# Patient Record
Sex: MALE | Race: OTHER
[De-identification: names, ages, dates, MRNs, and addresses within clinical notes are randomized per-mention and may not be internally consistent; named-entity substitution may affect disease eponyms.]

---

## 2017-05-23 ENCOUNTER — HOSPITAL ENCOUNTER (EMERGENCY)
Dept: HOSPITAL 10 - E/R | Age: 67
Discharge: HOME | End: 2017-05-23
Payer: MEDICARE

## 2017-05-23 VITALS
HEIGHT: 66.14 IN | BODY MASS INDEX: 29.79 KG/M2 | BODY MASS INDEX: 29.79 KG/M2 | HEIGHT: 66.14 IN | WEIGHT: 185.39 LBS | WEIGHT: 185.39 LBS

## 2017-05-23 VITALS
RESPIRATION RATE: 20 BRPM | SYSTOLIC BLOOD PRESSURE: 107 MMHG | HEART RATE: 97 BPM | DIASTOLIC BLOOD PRESSURE: 67 MMHG | TEMPERATURE: 98 F

## 2017-05-23 DIAGNOSIS — R40.2362: ICD-10-CM

## 2017-05-23 DIAGNOSIS — Z87.891: ICD-10-CM

## 2017-05-23 DIAGNOSIS — R40.2252: ICD-10-CM

## 2017-05-23 DIAGNOSIS — R40.2142: ICD-10-CM

## 2017-05-23 DIAGNOSIS — J45.21: Primary | ICD-10-CM

## 2017-05-23 PROCEDURE — 94645 CONT INHLJ TX EACH ADDL HOUR: CPT

## 2017-05-23 PROCEDURE — 96372 THER/PROPH/DIAG INJ SC/IM: CPT

## 2017-05-23 PROCEDURE — 94644 CONT INHLJ TX 1ST HOUR: CPT

## 2017-05-23 PROCEDURE — 99284 EMERGENCY DEPT VISIT MOD MDM: CPT

## 2017-05-23 NOTE — ERD
ER Documentation


Chief Complaint


Date/Time


DATE: 5/23/17 


TIME: 04:30


Chief Complaint


SOB,wheezing,hx asthma





HPI


This is a 67-year-old male comes in with shortness of breath and wheezing.  

Patient has history of asthma.  He did he adamantly says he does not want a 

chest x-ray but does want a breathing treatment.  Denies any fevers or chills.  

Denies any other current issues.





ROS


All systems reviewed and are negative except as per history of present illness.





Medications


Home Meds


Active Scripts


Naproxen* (Naprosyn*) 500 Mg Tablet, 500 MG PO BID Y for PAIN AND/OR 

INFLAMMATION, #30 TAB


   Prov:MANAGUELOD,KESHAWN P NP         8/7/16


Hydrocodone Bit-Acetaminophen* (Norco*)  Mg Tablet, 1 TAB PO Q6 Y for PAIN

, #7 TAB


   Prov:STEPHANIA LYLE MD         1/25/16


Sulfamethoxazole-Trimethoprim* (Bactrim* DS) 800-160 Mg Tab, 1 TAB PO BID for 7 

Days, TAB


   Prov:STEPHANIA LYLE MD         1/25/16


Cephalexin* (Keflex*) 500 Mg Capsule, 500 MG PO QID for 7 Days, CAP


   Prov:STEPHANIA LYLE MD         1/25/16


Reported Medications


Ipratropium Bromide* (Atrovent HFA*) 12.9 Gm Aer.w.adap, 2 PUFF INHALATION QID, 

#1 INHALER


   1/25/16





Allergies


Allergies:  


Coded Allergies:  


     No Known Allergy (Unverified , 1/25/16)





PMhx/Soc


History of Surgery:  Yes (appendectomy)


Hx Neurological Disorder:  No


Hx Respiratory Disorders:  Yes (ASTHMA)


Hx Cardiac Disorders:  No


Hx Psychiatric Problems:  No


Hx Miscellaneous Medical Probl:  No


Hx Alcohol Use:  No


Hx Substance Use:  No


Hx Tobacco Use:  Yes (quit 2014)


Smoking Status:  Former smoker





Physical Exam


Vitals





Vital Signs








  Date Time  Temp Pulse Resp B/P Pulse Ox O2 Delivery O2 Flow Rate FiO2


 


5/23/17 04:23  98 22 107/72 100 Venturi Mask 15.0 


 


5/23/17 03:43      Nasal Cannula 3 


 


5/23/17 03:43      Nasal Cannula 3.0 


 


5/23/17 03:43  95 24  99 Nasal Cannula 3.0 


 


5/23/17 03:22 97.5 101 18 121/74 95   








Physical Exam


Const:  []


Head:   Atraumatic 


Eyes:    Normal Conjunctiva


ENT:    Normal External Ears, Nose and Mouth.


Neck:               Full range of motion..~ No meningismus.


Resp:   Scattered wheezes bilaterally.


Cardio:    Regular rate and rhythm, no murmurs


Abd:    Soft, non tender, non distended. Normal bowel sounds


Skin:    No petechiae or rashes


Back:    No midline or flank tenderness


Ext:    No cyanosis, or edema


Neur:    Awake and alert


Psych:    Normal Mood and Affect


Results 24 hrs





 Current Medications








 Medications


  (Trade)  Dose


 Ordered  Sig/Hemalatha


 Route


 PRN Reason  Start Time


 Stop Time Status Last Admin


Dose Admin


 


 Albuterol


  (Proventil 0.5%


  (Neb))  10 mg  ONCE  STAT


 INH


   5/23/17 03:30


 5/23/17 03:32 DC 5/23/17 03:38


 


 


 Ipratropium


 Bromide


  (Atrovent 0.02%


  (Neb))  1 mg  ONCE  STAT


 INH


   5/23/17 03:30


 5/23/17 03:32 DC 5/23/17 03:38


 


 


 Dexamethasone


  (Decadron)  10 mg  ONCE  STAT


 IM


   5/23/17 03:30


 5/23/17 03:33 DC 5/23/17 03:47


 











Procedures/MDM


Patient's respiratory status has stabilized while in the department and is 

appropriate for outpatient work up.


Exam and work up not consistent w/ impending respiratory failure or 

cardiovascular collapse.





Departure


Diagnosis:  


 Primary Impression:  


 Acute asthma exacerbation


 Asthma severity:  mild intermittent  Qualified Code:  J45.21 - Mild 

intermittent asthma with acute exacerbation


Condition:  Stable











KATHARINA CHANG May 23, 2017 04:31

## 2017-06-05 ENCOUNTER — HOSPITAL ENCOUNTER (INPATIENT)
Dept: HOSPITAL 10 - E/R | Age: 67
LOS: 3 days | Discharge: HOME | DRG: 192 | End: 2017-06-08
Attending: INTERNAL MEDICINE | Admitting: INTERNAL MEDICINE
Payer: COMMERCIAL

## 2017-06-05 VITALS
BODY MASS INDEX: 31.04 KG/M2 | BODY MASS INDEX: 31.04 KG/M2 | HEIGHT: 66 IN | HEIGHT: 66 IN | WEIGHT: 193.12 LBS | WEIGHT: 193.12 LBS

## 2017-06-05 VITALS — DIASTOLIC BLOOD PRESSURE: 75 MMHG | RESPIRATION RATE: 18 BRPM | SYSTOLIC BLOOD PRESSURE: 134 MMHG

## 2017-06-05 VITALS — RESPIRATION RATE: 18 BRPM | DIASTOLIC BLOOD PRESSURE: 65 MMHG | SYSTOLIC BLOOD PRESSURE: 135 MMHG

## 2017-06-05 VITALS — SYSTOLIC BLOOD PRESSURE: 129 MMHG | HEART RATE: 87 BPM | RESPIRATION RATE: 18 BRPM | DIASTOLIC BLOOD PRESSURE: 68 MMHG

## 2017-06-05 VITALS — HEART RATE: 89 BPM

## 2017-06-05 VITALS — HEART RATE: 84 BPM

## 2017-06-05 VITALS — TEMPERATURE: 98.3 F

## 2017-06-05 DIAGNOSIS — Z87.891: ICD-10-CM

## 2017-06-05 DIAGNOSIS — J44.1: Primary | ICD-10-CM

## 2017-06-05 LAB
ADD SCAN DIFF: NO
ADD UMIC: NO
ALBUMIN SERPL-MCNC: 4.6 G/DL (ref 3.3–4.9)
ALBUMIN/GLOB SERPL: 1.58 {RATIO}
ALP SERPL-CCNC: 62 IU/L (ref 42–121)
ALT SERPL-CCNC: 42 IU/L (ref 13–69)
ANION GAP SERPL CALC-SCNC: 11 MMOL/L (ref 8–16)
APTT BLD: 26.9 SEC (ref 25–35)
ARTERIAL COHB: 0.3 % (ref 0–3)
ARTERIAL FRACTION OF OXYHGB: 98.6 % (ref 93–99)
ARTERIAL METHB: 0.4 % (ref 0–1.5)
ARTERIAL PATENCY WRIST A: (no result)
ARTERIAL TOTAL HEMGLOBIN: 13.8 G/DL (ref 12–18)
AST SERPL-CCNC: 21 IU/L (ref 15–46)
BASE EXCESS BLDA CALC-SCNC: -1.3 MMOL/L (ref -3–3)
BASOPHILS # BLD AUTO: 0 10^3/UL (ref 0–0.1)
BASOPHILS NFR BLD: 0.4 % (ref 0–2)
BILIRUB DIRECT SERPL-MCNC: 0 MG/DL (ref 0–0.2)
BILIRUB SERPL-MCNC: 0.2 MG/DL (ref 0.2–1.3)
BLOOD GAS IEPAP: (no result)
BNP SERPL-MCNC: 37 PG/ML (ref 0–125)
BUN SERPL-MCNC: 16 MG/DL (ref 7–20)
CALCIUM SERPL-MCNC: 9.1 MG/DL (ref 8.4–10.2)
CHLORIDE SERPL-SCNC: 105 MMOL/L (ref 97–110)
CO2 SERPL-SCNC: 30 MMOL/L (ref 21–31)
COLOR UR: (no result)
CREAT SERPL-MCNC: 0.91 MG/DL (ref 0.61–1.24)
EOSINOPHIL # BLD: 0.4 10^3/UL (ref 0–0.5)
EOSINOPHIL NFR BLD: 5.1 % (ref 0–7)
ERYTHROCYTE [DISTWIDTH] IN BLOOD BY AUTOMATED COUNT: 13.2 % (ref 11.5–14.5)
GLOBULIN SER-MCNC: 2.9 G/DL (ref 1.3–3.2)
GLUCOSE SERPL-MCNC: 119 MG/DL (ref 70–220)
GLUCOSE UR STRIP-MCNC: NEGATIVE %
HCO3 BLDA-SCNC: 24.4 MMOL/L (ref 22–26)
HCT VFR BLD CALC: 40.8 % (ref 42–52)
HGB BLD-MCNC: 13.2 G/DL (ref 14–18)
INR PPP: 0.89
KETONES UR STRIP.AUTO-MCNC: NEGATIVE MG/DL
LYMPHOCYTES # BLD AUTO: 2.4 10^3/UL (ref 0.8–2.9)
LYMPHOCYTES NFR BLD AUTO: 31.9 % (ref 15–51)
MCH RBC QN AUTO: 31.1 PG (ref 29–33)
MCHC RBC AUTO-ENTMCNC: 32.4 G/DL (ref 32–37)
MCV RBC AUTO: 96.2 FL (ref 82–101)
MODE: (no result)
MONOCYTES # BLD: 0.9 10^3/UL (ref 0.3–0.9)
MONOCYTES NFR BLD: 11.2 % (ref 0–11)
NEUTROPHILS # BLD: 3.9 10^3/UL (ref 1.6–7.5)
NEUTROPHILS NFR BLD AUTO: 51.1 % (ref 39–77)
NITRITE UR QL STRIP.AUTO: NEGATIVE
NRBC # BLD MANUAL: 0 10^3/UL (ref 0–0)
NRBC BLD QL: 0 /100WBC (ref 0–0)
O2 A-A PPRESDIFF RESPIRATORY: 93.4 MMHG (ref 7–24)
O2/TOTAL GAS SETTING VFR VENT: 60 %
PLATELET # BLD: 234 10^3/UL (ref 140–415)
PMV BLD AUTO: 9.4 FL (ref 7.4–10.4)
POTASSIUM SERPL-SCNC: 4.3 MMOL/L (ref 3.5–5.1)
PROT SERPL-MCNC: 7.5 G/DL (ref 6.1–8.1)
PROTHROMBIN TIME: 12 SEC (ref 12.2–14.2)
PT RATIO: 0.9
RBC # BLD AUTO: 4.24 10^6/UL (ref 4.7–6.1)
RBC # UR AUTO: NEGATIVE /UL
SODIUM SERPL-SCNC: 142 MMOL/L (ref 135–144)
TROPONIN I SERPL-MCNC: < 0.012 NG/ML (ref 0–0.12)
URINE BILIRUBIN (DIP): NEGATIVE
URINE TOTAL PROTEIN (DIP): NEGATIVE
UROBILINOGEN UR STRIP-ACNC: (no result) (ref 0.1–1)
WBC # BLD AUTO: 7.6 10^3/UL (ref 4.8–10.8)
WBC # UR STRIP: NEGATIVE /UL

## 2017-06-05 PROCEDURE — 85025 COMPLETE CBC W/AUTO DIFF WBC: CPT

## 2017-06-05 PROCEDURE — 82803 BLOOD GASES ANY COMBINATION: CPT

## 2017-06-05 PROCEDURE — 85610 PROTHROMBIN TIME: CPT

## 2017-06-05 PROCEDURE — 87081 CULTURE SCREEN ONLY: CPT

## 2017-06-05 PROCEDURE — 85730 THROMBOPLASTIN TIME PARTIAL: CPT

## 2017-06-05 PROCEDURE — 96372 THER/PROPH/DIAG INJ SC/IM: CPT

## 2017-06-05 PROCEDURE — 84484 ASSAY OF TROPONIN QUANT: CPT

## 2017-06-05 PROCEDURE — 80053 COMPREHEN METABOLIC PANEL: CPT

## 2017-06-05 PROCEDURE — 83880 ASSAY OF NATRIURETIC PEPTIDE: CPT

## 2017-06-05 PROCEDURE — 93306 TTE W/DOPPLER COMPLETE: CPT

## 2017-06-05 PROCEDURE — 94644 CONT INHLJ TX 1ST HOUR: CPT

## 2017-06-05 PROCEDURE — 83605 ASSAY OF LACTIC ACID: CPT

## 2017-06-05 PROCEDURE — 81003 URINALYSIS AUTO W/O SCOPE: CPT

## 2017-06-05 PROCEDURE — 94660 CPAP INITIATION&MGMT: CPT

## 2017-06-05 PROCEDURE — 96375 TX/PRO/DX INJ NEW DRUG ADDON: CPT

## 2017-06-05 PROCEDURE — 71010: CPT

## 2017-06-05 PROCEDURE — 36600 WITHDRAWAL OF ARTERIAL BLOOD: CPT

## 2017-06-05 PROCEDURE — 36415 COLL VENOUS BLD VENIPUNCTURE: CPT

## 2017-06-05 PROCEDURE — 94664 DEMO&/EVAL PT USE INHALER: CPT

## 2017-06-05 PROCEDURE — 96376 TX/PRO/DX INJ SAME DRUG ADON: CPT

## 2017-06-05 PROCEDURE — 96374 THER/PROPH/DIAG INJ IV PUSH: CPT

## 2017-06-05 PROCEDURE — 93005 ELECTROCARDIOGRAM TRACING: CPT

## 2017-06-05 PROCEDURE — 87040 BLOOD CULTURE FOR BACTERIA: CPT

## 2017-06-05 RX ADMIN — ALBUTEROL SULFATE SCH PUFF: 90 AEROSOL, METERED RESPIRATORY (INHALATION) at 13:00

## 2017-06-05 RX ADMIN — ENOXAPARIN SODIUM SCH MG: 100 INJECTION SUBCUTANEOUS at 09:56

## 2017-06-05 RX ADMIN — ALBUTEROL SULFATE SCH PUFF: 90 AEROSOL, METERED RESPIRATORY (INHALATION) at 21:53

## 2017-06-05 RX ADMIN — ALBUTEROL SULFATE SCH PUFF: 90 AEROSOL, METERED RESPIRATORY (INHALATION) at 17:27

## 2017-06-05 RX ADMIN — ALBUTEROL SULFATE SCH PUFF: 90 AEROSOL, METERED RESPIRATORY (INHALATION) at 09:53

## 2017-06-05 NOTE — HP
DATE OF ADMISSION: 06/05/2017

 

TIME SEEN: 7 a.m.

 

CHIEF COMPLAINT: Shortness of breath.

 

HISTORY OF PRESENT ILLNESS: The patient is a 67-year-old male with a history of asthma/COPD who pres
ented to the emergency department complaining of shortness of breath.  Her symptoms have been progre
ssively getting worse.  He denied chest pain, nausea, vomiting, fever, chills, lightheadedness, palp
itations.  When he presented to the ER, he was having difficulty breathing and he was placed on BiPA
P.  His initial ABG shows a pH of 7.355, pCO2 of 45 and pO2 to 85, bicarbonate 24 while he was on Bi
PAP on 60% FIO2.  Currently, he is in the ER and also BiPAP and he actually looks comfortable and ab
le to speak in full sentences, [place], I talked to him through an [place] . 

 

In the ER, a chest x-ray showed shallow lung inflation with a probable top normal heart size. Questi
on slight aortic calcifications. 

 

REVIEW OF SYSTEMS:  A 12-point review was performed, negative except as mentioned in HPI.

 

PAST MEDICAL HISTORY:  As per HPI.

 

PAST SURGICAL HISTORY:  Appendectomy.

 

SOCIAL HISTORY:  He is an ex-smoker.  He quit almost 3 years ago.

 

ALLERGIES:  NO KNOWN DRUG ALLERGIES.

 

HOME MEDICATIONS:

1.  Albuterol.

2.  Atrovent.

3.  Norco. 

4.  Naproxen.

 

PHYSICAL EXAMINATION:

VITAL SIGNS:  Stable.

GENERAL:  The patient lying in the gurney in no acute distress, looks comfortable.

HEENT:  No obvious head deformity. Pupils equal, round and react to light. Extraocular muscles intac
t.

CARDIOVASCULAR:  Regular rate and rhythm.  No extra heart sounds.

LUNGS:  Slight scattered wheezing.

ABDOMEN:  Soft, nontender, nondistended.  Positive bowel sounds.

EXTREMITIES:  He has 1+ pitting edema in bilateral lower extremities.

NEUROLOGIC:  No focal deficits.

 

LABORATORY DATA:  CBC and CMP within acceptable range.

 

IMAGING:  Chest x-ray as mentioned in HPI.

 

IMPRESSION: Chronic obstructive pulmonary disease exacerbation.

 

PLAN: We will place him on oxygen, bronchodilators, and steroids.  Positive pressure ventilation as 
needed.  Pulmonary consult as needed.

 

Further workup ____  per clinical course.

 

 

Dictated By: KATHARINA ADAM/NILDA

DD:    06/05/2017 08:50:53

DT:    06/05/2017 10:58:29

Conf#: [id number]

DID#:  [id number]

## 2017-06-05 NOTE — RADRPT
Echocardiogram Report

 

Patient Name:  LILI ZAYAS  Gender:       Male

MRN:           [id number]          Accession #:  [id number]

Birth Date:    1950      Study Date:   05-Jun-2017

Sonographer:   GARCÍA Lopez BRIDGET     Location:     3

 

Ref. Physician: KATHARINA KHALIL

Quality: Good

 

Procedures: Transthoracic echocardiogram with complete 2D, M-Mode, and 

doppler examination.

Indications: Shortness of breath.

 

2D/M Mode                          Doppler

Measurement  Value  Normal Ranges  Measurement    Value  Normal Ranges

LVIDd 2D     4.7    3.5 - 5.6 cm   AV Peak Rey    1.1    m/sec

LVIDs 2D     2.2    2.1 - 4.1 cm   AV Peak PG     5.0    mmHg

FS 2D        53.2   %              LVOT Peak Rey  1.1    m/sec

LVPWd 2D     1.1    0.6 - 1.1 cm   LVOT Peak PG   5.0    mmHg

IVSd 2D      1.2    0.6 - 1.1 cm   MV E Peak Rey  0.6    m/sec

IVS/LVPW 2D  1.1                   MV A Peak Rey  0.8    m/sec

AoR Diam 2D  3.7    2.0 - 3.7 cm   MV E/A         0.8

LA/Ao 2D     1      0 - 1          MV Decel Time  148    msec

EDV 2D       105.0  cm3            MV E/A         0.8

ESV 2D       10.8   cm3

LA Dimen 2D  3.2    2.3 - 4.0 cm

 

Findings

Left Ventricle: Normal left ventricular systolic function.  Normal left 

ventricular cavity size.  Mild concentric left ventricular hypertrophy.  

Ejection fraction is visually estimated at 65 %.  Tissue Doppler/Mitral 

Doppler indices are consistent with impaired relaxation (Stage I 

diastolic dysfunction).

Right Ventricle: Normal right ventricular systolic function.  Not well 

visualized.

Left Atrium: The left atrium is normal in size.

Right Atrium: The right atrium is normal in size.

Mitral Valve: Normal appearance and function of the mitral valve with 

trace physiologic regurgitation.

Aortic Valve: Normal appearance of the aortic valve.  No significant 

aortic stenosis or insufficiency.

Tricuspid Valve: Normal appearance of the tricuspid valve.  Unable to 

obtain RVSP due to minimal presence of tricuspid regurgitation.

Pulmonic Valve: Pulmonic valve not well visualized.

Pericardium: Normal pericardium with no significant pericardial effusion.

Aorta: Normal aortic root.

IVC: Dilated IVC with respiratory collapse consistent with elevated 

right atrial pressure.

 

Conclusions

1.Normal left ventricular systolic function.  Normal left ventricular 

cavity size.  Mild concentric left ventricular hypertrophy.  Ejection 

fraction is visually estimated at 65 %.  Tissue Doppler/Mitral Doppler 

indices are consistent with impaired relaxation (Stage I diastolic 

dysfunction).

2.The left atrium is normal in size.

3.Normal appearance and function of the mitral valve with trace 

physiologic regurgitation.

4.Normal appearance of the aortic valve.  No significant aortic 

stenosis or insufficiency.

5.Normal appearance of the tricuspid valve.  Unable to obtain RVSP due 

to minimal presence of tricuspid regurgitation.

 

Electronically Signed By:

Gonzalez Holguin

05-Jun-2017 18:08:45  -0700

 

Patient Name: LILI ZAYAS

MRN: [id number]

Study Date: 05-Jun-2017

 

[id number]

## 2017-06-05 NOTE — ERA
ER Documentation


Chief Complaint


Date/Time


DATE: 17 


TIME: 03:20


Chief Complaint


BIBA RA89 from home,SOB,hx asthma/COPD





HPI


This is a 67-year-old male brought in from home via rescue with complaints of 

shortness of breath.  No nausea no vomiting no fevers no chills.  Patient 

states that he became acutely short of breath over the last few hours.  No 

other current complaints.  No chest pain.  History of multiple previous 

admissions for respiratory complaints





ROS


All systems reviewed and are negative except as per history of present illness.





Medications


Home Meds


Active Scripts


Prednisone* (Prednisone*) 20 Mg Tab, 40 MG PO DAILY for 4 Days, TAB


   Prov:KATHARINA CHANG S.         17


Albuterol Sulfate* (Ventolin HFA*) 18 Gm Hfa.aer.ad, 2 PUFF INHALATION Q4H, #1 

INHALER


   Prov:KATHARINA CHANG.         17


Naproxen* (Naprosyn*) 500 Mg Tablet, 500 MG PO BID Y for PAIN AND/OR 

INFLAMMATION, #30 TAB


   Prov:KESHAWN GARY P NP         16


Hydrocodone Bit-Acetaminophen* (Norco*)  Mg Tablet, 1 TAB PO Q6 Y for PAIN

, #7 TAB


   Prov:STEPHANIA LYLE MD         16


Sulfamethoxazole-Trimethoprim* (Bactrim* DS) 800-160 Mg Tab, 1 TAB PO BID for 7 

Days, TAB


   Prov:STEPHANIA LYLE MD         16


Cephalexin* (Keflex*) 500 Mg Capsule, 500 MG PO QID for 7 Days, CAP


   Prov:STEPHANIA LYLE MD         16


Reported Medications


Ipratropium Bromide* (Atrovent HFA*) 12.9 Gm Aer.w.adap, 2 PUFF INHALATION QID, 

#1 INHALER


   16





Allergies


Allergies:  


Coded Allergies:  


     No Known Allergy (Unverified , 16)





PMhx/Soc


History of Surgery:  Yes (appendectomy)


Hx Neurological Disorder:  No


Hx Respiratory Disorders:  Yes (ASTHMA; COPD)


Hx Cardiac Disorders:  No


Hx Psychiatric Problems:  No


Hx Miscellaneous Medical Probl:  No


Hx Alcohol Use:  No


Hx Substance Use:  No


Hx Tobacco Use:  Yes (quit )


Smoking Status:  Former smoker





Physical Exam


Vitals





Vital Signs








  Date Time  Temp Pulse Resp B/P Pulse Ox O2 Delivery O2 Flow Rate FiO2


 


17 01:58 97.3 98 28 108/95 100 BIPAP  


 


17 01:30  99   100   60


 


17 01:27 97.3 92 32 123/89 100   








Physical Exam


Const:  []


Head:   Atraumatic 


Eyes:    Normal Conjunctiva


ENT:    Normal External Ears, Nose and Mouth.


Neck:               Full range of motion..~ No meningismus.


Resp:    Clear to auscultation bilaterally


Cardio:    Regular rate and rhythm, no murmurs


Abd:    Soft, non tender, non distended. Normal bowel sounds


Skin:    No petechiae or rashes


Back:    No midline or flank tenderness


Ext:    No cyanosis, or edema


Neur:    Awake and alert


Psych:    Normal Mood and Affect


Result Diagram:  


17 [id number] 013





Results 24 hrs





 Laboratory Tests








Test


  17


01:23 17


01:30


 


Blood Gas Specimen Source Blood arterial  


 


Arterial Blood Date Drawn


  2017


2:55:18 AM 


 


 


Arterial Blood pH (Temp


corrected) 7.355 


  


 


 


Arterial Blood pCO2 (Temp


correct) 44.7mmhg 


  


 


 


Arterial Blood pO2 (Temp


corrected) 285.2mmHG 


  


 


 


Arterial Blood HCO3 24.4mmol/L  


 


Arterial Blood Base Excess -1.3mmol/L  


 


Arterial Blood Oxygen


Saturation 99.3mmHG 


  


 


 


Chandler Test ACCEPTAB  


 


Arterial Blood Gas Puncture


Site Right Radial 


  


 


 


Arterial Blood


Carboxyhemoglobin 0.3% 


  


 


 


Arterial Blood Methemoglobin 0.4%  


 


Blood Gas A-a O2 Differential 93.4mmHg  


 


Oxyhemoglobin Percent 98.6%  


 


Total Hemoglobin 13.8g/dl  


 


Blood Gas Temperature 37.0C  


 


Blood Gas Respiration Rate 16.0  


 


Blood Gas Actual Respiration


Rate 20 


  


 


 


Blood Gas Modality MASK - BIPAP  


 


FiO2 60.0%  


 


Blood Gas Inspiratory Time 0.7  


 


Blood Gas IPAP/EPAP Ratio 15/5  


 


Blood Gas Notified Whom AA  


 


Blood Gas Notified Time


  2017


3:08:14 AM 


 


 


White Blood Count  7.610^3/ul 


 


Red Blood Count  4.2410^6/ul 


 


Hemoglobin  13.2g/dl 


 


Hematocrit  40.8% 


 


Mean Corpuscular Volume  96.2fl 


 


Mean Corpuscular Hemoglobin  31.1pg 


 


Mean Corpuscular Hemoglobin


Concent 


  32.4g/dl 


 


 


Red Cell Distribution Width  13.2% 


 


Platelet Count  95953^3/UL 


 


Mean Platelet Volume  9.4fl 


 


Neutrophils %  51.1% 


 


Lymphocytes %  31.9% 


 


Monocytes %  11.2% 


 


Eosinophils %  5.1% 


 


Basophils %  0.4% 


 


Nucleated Red Blood Cells %  0.0/100WBC 


 


Neutrophils #  3.910^3/ul 


 


Lymphocytes #  2.410^3/ul 


 


Monocytes #  0.910^3/ul 


 


Eosinophils #  0.410^3/ul 


 


Basophils #  0.010^3/ul 


 


Nucleated Red Blood Cells #  0.010^3/ul 


 


Prothrombin Time  12.0Sec 


 


Prothrombin Time Ratio  0.9 


 


INR International Normalized


Ratio 


  0.89 


 


 


Activated Partial


Thromboplast Time 


  26.9Sec 


 


 


Sodium Level  142mmol/L 


 


Potassium Level  4.3mmol/L 


 


Chloride Level  105mmol/L 


 


Carbon Dioxide Level  30mmol/L 


 


Anion Gap  11 


 


Blood Urea Nitrogen  16mg/dl 


 


Creatinine  0.91mg/dl 


 


Glucose Level  119mg/dl 


 


Lactic Acid Level  1.2mmol/L 


 


Calcium Level  9.1mg/dl 


 


Total Bilirubin  0.2mg/dl 


 


Direct Bilirubin  0.00mg/dl 


 


Indirect Bilirubin  0.2mg/dl 


 


Aspartate Amino Transf


(AST/SGOT) 


  21IU/L 


 


 


Alanine Aminotransferase


(ALT/SGPT) 


  42IU/L 


 


 


Alkaline Phosphatase  62IU/L 


 


Troponin I  < 0.012ng/ml 


 


B-Type Natriuretic Peptide  37PG/ML 


 


Total Protein  7.5g/dl 


 


Albumin  4.6g/dl 


 


Globulin  2.90g/dl 


 


Albumin/Globulin Ratio  1.58 








 Current Medications








 Medications


  (Trade)  Dose


 Ordered  Sig/Hemalatha


 Route


 PRN Reason  Start Time


 Stop Time Status Last Admin


Dose Admin


 


 Albuterol


  (Proventil 0.5%


  (Neb))  10 mg  ONCE  STAT


 INH


   17 01:23


 17 01:25 DC 17 01:35


 


 


 Ipratropium


 Bromide


  (Atrovent 0.02%


  (Neb))  1 mg  ONCE  STAT


 INH


   17 01:23


 17 01:25 DC 17 01:35


 


 


 Methylprednisolone


 Sodium Succinate


  (Solu-Medrol)  125 mg  ONCE  STAT


 IV


   17 01:23


 17 01:26 DC 17 01:54


 


 


 Lorazepam


  (Ativan)  1 mg  ONCE  ONCE


 IV


   17 01:30


 17 01:31 DC 17 01:54


 











Procedures/MDM


EKG: 


Rate/Rhythm:             [Normal Sinus Rhythm]


QRS, ST, T-waves:    [No changes consistent w/ acute ischemia]


Impression:      [No evidence of ischemia or arrhythmia] 





Chest X-ray 1V Interpreted by me:


Soft Tissue:                                               No acute 

abnormalities


Bones:                                                    No acute abnormalities


Mediastinum/Cardiac Silhouette/Lungs:     [No acute abnormalities]





Medical decision-makin general respiratory failure secondary COPD.  

Started on BiPAP.  Improving significantly with steroids and nebulized therapy, 

however patient will be admitted to panel for further evaluation and management 

given that he is still using accessory muscles.





Critical Care:


   Time:             45 minutes


   Treatments/Evaluations:   Close monitoring and treatment of unstable vital 

signs, cardiorespiratory, and neurologic status, while maintaining tight 

balance of fluid, respiratory, and cardiac interventions.





Departure


Diagnosis:  


 Primary Impression:  


 Shortness of breath


Condition:  Stable











KATHARINA CHANG 2017 03:22

## 2017-06-05 NOTE — RADRPT
PROCEDURE:   XR Chest. 

 

CLINICAL INDICATION:   Shortness of breath 

 

TECHNIQUE:   Portable single view of the chest

 

COMPARISON:   None. 

 

FINDINGS:

Shallow lung inflation accentuates the heart size which may be top normal.  There may be slight aort
ic calcification.  No acute infiltrate, pleural effusion, or overt congestive heart failure is seen.
  No bony abnormality is seen. 

 

IMPRESSION:

Shallow lung inflation with probable top normal heart size.  Question slight aortic calcification. 

 

RPTAT: HLBE

_____________________________________________ 

Zayra Noyola Physician           Date    Time 

Electronically viewed and signed by Zayra Noyola Physician on 06/05/2017 04:31 

 

D:  06/05/2017 04:31  T:  06/05/2017 04:31

LE/

## 2017-06-06 VITALS — DIASTOLIC BLOOD PRESSURE: 70 MMHG | RESPIRATION RATE: 20 BRPM | SYSTOLIC BLOOD PRESSURE: 111 MMHG

## 2017-06-06 VITALS — HEART RATE: 83 BPM

## 2017-06-06 VITALS — SYSTOLIC BLOOD PRESSURE: 132 MMHG | RESPIRATION RATE: 19 BRPM | DIASTOLIC BLOOD PRESSURE: 70 MMHG

## 2017-06-06 VITALS — SYSTOLIC BLOOD PRESSURE: 128 MMHG | DIASTOLIC BLOOD PRESSURE: 69 MMHG | RESPIRATION RATE: 20 BRPM

## 2017-06-06 VITALS — HEART RATE: 79 BPM

## 2017-06-06 VITALS — HEART RATE: 77 BPM

## 2017-06-06 VITALS — HEART RATE: 91 BPM

## 2017-06-06 VITALS — HEART RATE: 69 BPM

## 2017-06-06 VITALS — SYSTOLIC BLOOD PRESSURE: 116 MMHG | RESPIRATION RATE: 20 BRPM | DIASTOLIC BLOOD PRESSURE: 66 MMHG

## 2017-06-06 VITALS — SYSTOLIC BLOOD PRESSURE: 110 MMHG | RESPIRATION RATE: 20 BRPM | DIASTOLIC BLOOD PRESSURE: 70 MMHG

## 2017-06-06 VITALS — RESPIRATION RATE: 20 BRPM | DIASTOLIC BLOOD PRESSURE: 71 MMHG | SYSTOLIC BLOOD PRESSURE: 127 MMHG

## 2017-06-06 RX ADMIN — ENOXAPARIN SODIUM SCH MG: 100 INJECTION SUBCUTANEOUS at 09:17

## 2017-06-06 RX ADMIN — ALBUTEROL SULFATE SCH PUFF: 90 AEROSOL, METERED RESPIRATORY (INHALATION) at 19:48

## 2017-06-06 RX ADMIN — ALBUTEROL SULFATE SCH PUFF: 90 AEROSOL, METERED RESPIRATORY (INHALATION) at 12:23

## 2017-06-06 RX ADMIN — ALBUTEROL SULFATE SCH PUFF: 90 AEROSOL, METERED RESPIRATORY (INHALATION) at 01:00

## 2017-06-06 RX ADMIN — LEVOFLOXACIN SCH MLS/HR: 250 INJECTION, SOLUTION INTRAVENOUS at 17:11

## 2017-06-06 RX ADMIN — ALBUTEROL SULFATE SCH PUFF: 90 AEROSOL, METERED RESPIRATORY (INHALATION) at 09:10

## 2017-06-06 RX ADMIN — ALBUTEROL SULFATE SCH PUFF: 90 AEROSOL, METERED RESPIRATORY (INHALATION) at 05:53

## 2017-06-06 NOTE — PN
Date/Time of Note


Date/Time of Note


DATE: 6/6/17 


TIME: 15:07





Assessment/Plan


VTE Prophylaxis


VTE Prophylaxis Intervention:  LMWH





Lines/Catheters


IV Catheter Type (from Nrs):  Saline Lock


Urinary Cath still in place:  No





Assessment/Plan


Assessment/Plan


1. COPD exacerbation, inhaler/soluMedrol/levaquin





Subjective


24 Hr Interval Summary


Free Text/Dictation


less SOB but still unable to finish one sentence





Exam/Review of Systems


Vital Signs


Vitals





 Vital Signs








  Date Time  Temp Pulse Resp B/P Pulse Ox O2 Delivery O2 Flow Rate FiO2


 


6/6/17 12:46  79      


 


6/6/17 11:16 97.9  20 111/70 94   


 


6/6/17 08:30      Nasal Cannula 2.0 


 


6/5/17 04:25        30














 Intake and Output   


 


 6/5/17 6/5/17 6/6/17





 15:00 23:00 07:00


 


Intake Total  500 ml 600 ml


 


Balance  500 ml 600 ml











Exam


Constitutional:  alert, oriented, well developed


Psych:  nl mood/affect, no complaints


Head:  atraumatic, normocephalic


Eyes:  EOMI, nl conjunctiva, nl lids


ENMT:  nl external ears & nose, nl lips & teeth, nl nasal mucosa & septum


Neck:  non-tender, supple


Respiratory:  diminished breath sounds


Cardiovascular:  nl pulses, regular rate and rhythm, 


   No S3, No S4, No bruits, No diastolic murmur, No edema, No gallop, No 

irregular rhythm, No jugular venous distention (JVD), No murmurs/extra sounds, 

No other, No rub, No systolic murmur


Gastrointestinal:  nl liver, spleen, non-tender, soft, 


   No ascites, No bowel sounds, No distended, No firm, No hepatomegaly, No mass

, No other, No rebound or guarding, No splenomegaly, No surgical scars, No 

tender


Musculoskeletal:  nl extremities to inspection


Extremities:  normal pulses, 


   No calf tenderness, No clubbing, No cyanosis, No edema, No other, No 

palpable cord, No pitting pedal edema, No tenderness


Neurological:  CNS II-XII intact, nl mental status, nl speech, nl strength


Skin:  nl turgor


Lymph:  nl lymph nodes





Results


Result Diagram:  


6/5/17 0130                                                                    

            6/5/17 0130








Medications


Medications





 Current Medications


Ondansetron HCl (Zofran Inj) 4 mg Q6H  PRN IV NAUSEA AND/OR VOMITING;  Start 6/5 /17 at 08:30


Acetaminophen (Tylenol Tab) 650 mg Q6H  PRN PO PAIN LEVEL 1-3 OR FEVER;  Start 6 /5/17 at 08:30


Morphine Sulfate (morphine) 2 mg Q4H  PRN IV SEVERE PAIN LEVEL 7-10;  Start 6/5/ 17 at 08:30


Enoxaparin Sodium (Lovenox) 40 mg DAILY SC  Last administered on 6/6/17at 09:17

; Admin Dose 40 MG;  Start 6/5/17 at 09:00


Acetaminophen/ Hydrocodone Bitart (Norco (10/325)) 1 tab Q6H  PRN PO PAIN;  

Start 6/5/17 at 08:30


Naproxen (Naprosyn) 500 mg BID  PRN PO PAIN AND/OR INFLAMMATION;  Start 6/5/17 

at 09:00


Methylprednisolone Sodium Succinate (Solu-Medrol) 80 mg DAILY IV  Last 

administered on 6/6/17at 09:10; Admin Dose 80 MG;  Start 6/5/17 at 09:00











LAYNE VAZQUEZ MD Jun 6, 2017 15:09

## 2017-06-07 VITALS — RESPIRATION RATE: 18 BRPM | DIASTOLIC BLOOD PRESSURE: 73 MMHG | SYSTOLIC BLOOD PRESSURE: 117 MMHG

## 2017-06-07 VITALS — RESPIRATION RATE: 17 BRPM | SYSTOLIC BLOOD PRESSURE: 120 MMHG | DIASTOLIC BLOOD PRESSURE: 67 MMHG

## 2017-06-07 VITALS — DIASTOLIC BLOOD PRESSURE: 75 MMHG | SYSTOLIC BLOOD PRESSURE: 145 MMHG | RESPIRATION RATE: 17 BRPM

## 2017-06-07 VITALS — RESPIRATION RATE: 19 BRPM | SYSTOLIC BLOOD PRESSURE: 125 MMHG | DIASTOLIC BLOOD PRESSURE: 68 MMHG

## 2017-06-07 VITALS — SYSTOLIC BLOOD PRESSURE: 127 MMHG | DIASTOLIC BLOOD PRESSURE: 68 MMHG | RESPIRATION RATE: 20 BRPM

## 2017-06-07 VITALS — HEART RATE: 96 BPM

## 2017-06-07 VITALS — HEART RATE: 110 BPM

## 2017-06-07 VITALS — RESPIRATION RATE: 18 BRPM | SYSTOLIC BLOOD PRESSURE: 137 MMHG | DIASTOLIC BLOOD PRESSURE: 70 MMHG

## 2017-06-07 VITALS — HEART RATE: 71 BPM

## 2017-06-07 VITALS — HEART RATE: 80 BPM

## 2017-06-07 VITALS — HEART RATE: 70 BPM

## 2017-06-07 RX ADMIN — ACETYLCYSTEINE SCH ML: 200 SOLUTION ORAL; RESPIRATORY (INHALATION) at 16:30

## 2017-06-07 RX ADMIN — ALBUTEROL SULFATE SCH PUFF: 90 AEROSOL, METERED RESPIRATORY (INHALATION) at 14:32

## 2017-06-07 RX ADMIN — ALBUTEROL SULFATE SCH PUFF: 90 AEROSOL, METERED RESPIRATORY (INHALATION) at 07:39

## 2017-06-07 RX ADMIN — ENOXAPARIN SODIUM SCH MG: 100 INJECTION SUBCUTANEOUS at 07:44

## 2017-06-07 RX ADMIN — ACETYLCYSTEINE SCH ML: 200 SOLUTION ORAL; RESPIRATORY (INHALATION) at 20:00

## 2017-06-07 RX ADMIN — ALBUTEROL SULFATE SCH PUFF: 90 AEROSOL, METERED RESPIRATORY (INHALATION) at 21:14

## 2017-06-07 RX ADMIN — LEVOFLOXACIN SCH MLS/HR: 250 INJECTION, SOLUTION INTRAVENOUS at 16:03

## 2017-06-07 RX ADMIN — ALBUTEROL SULFATE SCH PUFF: 90 AEROSOL, METERED RESPIRATORY (INHALATION) at 00:45

## 2017-06-07 NOTE — PN
Date/Time of Note


Date/Time of Note


DATE: 6/7/17 


TIME: 16:23





Assessment/Plan


VTE Prophylaxis


VTE Prophylaxis Intervention:  heparin





Lines/Catheters


IV Catheter Type (from Nrs):  Peripheral IV


Urinary Cath still in place:  No





Assessment/Plan


Assessment/Plan


1. COPD exacerbation, inhaler/soluMedrol/levaquin, add mucomyst





Subjective


24 Hr Interval Summary


Free Text/Dictation


still has SOB





Exam/Review of Systems


Vital Signs


Vitals





 Vital Signs








  Date Time  Temp Pulse Resp B/P Pulse Ox O2 Delivery O2 Flow Rate FiO2


 


6/7/17 15:44 97.8 89 18 137/70 93   


 


6/6/17 21:49      Nasal Cannula 2.0 


 


6/5/17 04:25        30














 Intake and Output   


 


 6/6/17 6/6/17 6/7/17





 15:00 23:00 07:00


 


Intake Total  1200 ml 800 ml


 


Balance  1200 ml 800 ml











Exam


Constitutional:  alert, oriented, well developed


Psych:  nl mood/affect, no complaints


Head:  atraumatic, normocephalic


Eyes:  EOMI, PERRL, nl conjunctiva, nl lids


ENMT:  nl external ears & nose, nl lips & teeth, nl nasal mucosa & septum


Neck:  non-tender, supple


Respiratory:  diminished breath sounds


Cardiovascular:  nl pulses, regular rate and rhythm, 


   No S3, No S4, No bruits, No diastolic murmur, No edema, No gallop, No 

irregular rhythm, No jugular venous distention (JVD), No murmurs/extra sounds, 

No other, No rub, No systolic murmur


Gastrointestinal:  nl liver, spleen, non-tender, soft, 


   No ascites, No bowel sounds, No distended, No firm, No hepatomegaly, No mass

, No other, No rebound or guarding, No splenomegaly, No surgical scars, No 

tender


Musculoskeletal:  nl extremities to inspection


Extremities:  normal pulses, 


   No calf tenderness, No clubbing, No cyanosis, No edema, No other, No 

palpable cord, No pitting pedal edema, No tenderness


Neurological:  CNS II-XII intact, nl mental status, nl speech, nl strength


Skin:  nl turgor


Lymph:  nl lymph nodes





Results


Result Diagram:  


6/5/17 0130                                                                    

            6/5/17 0130








Medications


Medications





 Current Medications


Ondansetron HCl (Zofran Inj) 4 mg Q6H  PRN IV NAUSEA AND/OR VOMITING;  Start 6/5 /17 at 08:30


Acetaminophen (Tylenol Tab) 650 mg Q6H  PRN PO PAIN LEVEL 1-3 OR FEVER;  Start 6 /5/17 at 08:30


Morphine Sulfate (morphine) 2 mg Q4H  PRN IV SEVERE PAIN LEVEL 7-10;  Start 6/5/ 17 at 08:30


Enoxaparin Sodium (Lovenox) 40 mg DAILY SC  Last administered on 6/7/17at 07:44

; Admin Dose 40 MG;  Start 6/5/17 at 09:00


Acetaminophen/ Hydrocodone Bitart (Norco (10/325)) 1 tab Q6H  PRN PO PAIN;  

Start 6/5/17 at 08:30


Naproxen (Naprosyn) 500 mg BID  PRN PO PAIN AND/OR INFLAMMATION;  Start 6/5/17 

at 09:00


Methylprednisolone Sodium Succinate 40 mg 40 mg Q8H IV  Last administered on 6/7 /17at 07:39; Admin Dose 40 MG;  Start 6/6/17 at 17:00


Levofloxacin/ Dextrose (Levaquin 250 Mg/ D5W 50 ml (Pmx)) 50 ml @ 50 mls/hr 

Q24H IVPB  Last administered on 6/7/17at 16:03; Admin Dose 50 MLS/HR;  Start 6/6 /17 at 15:30


Zolpidem Tartrate (Ambien) 10 mg HS  PRN PO INSOMNIA;  Start 6/7/17 at 01:00











LAYNE VAZQUEZ MD Jun 7, 2017 16:24

## 2017-06-08 VITALS — SYSTOLIC BLOOD PRESSURE: 118 MMHG | DIASTOLIC BLOOD PRESSURE: 77 MMHG | RESPIRATION RATE: 20 BRPM

## 2017-06-08 VITALS — DIASTOLIC BLOOD PRESSURE: 69 MMHG | SYSTOLIC BLOOD PRESSURE: 122 MMHG | RESPIRATION RATE: 19 BRPM

## 2017-06-08 VITALS — RESPIRATION RATE: 19 BRPM | SYSTOLIC BLOOD PRESSURE: 121 MMHG | DIASTOLIC BLOOD PRESSURE: 78 MMHG

## 2017-06-08 VITALS — HEART RATE: 87 BPM

## 2017-06-08 VITALS — SYSTOLIC BLOOD PRESSURE: 123 MMHG | RESPIRATION RATE: 20 BRPM | DIASTOLIC BLOOD PRESSURE: 73 MMHG

## 2017-06-08 VITALS — HEART RATE: 68 BPM

## 2017-06-08 VITALS — HEART RATE: 74 BPM

## 2017-06-08 VITALS — SYSTOLIC BLOOD PRESSURE: 120 MMHG | RESPIRATION RATE: 20 BRPM | DIASTOLIC BLOOD PRESSURE: 78 MMHG

## 2017-06-08 VITALS — HEART RATE: 76 BPM

## 2017-06-08 RX ADMIN — LEVOFLOXACIN SCH MLS/HR: 250 INJECTION, SOLUTION INTRAVENOUS at 15:45

## 2017-06-08 RX ADMIN — ACETYLCYSTEINE SCH ML: 200 SOLUTION ORAL; RESPIRATORY (INHALATION) at 02:00

## 2017-06-08 RX ADMIN — ACETYLCYSTEINE SCH ML: 200 SOLUTION ORAL; RESPIRATORY (INHALATION) at 16:04

## 2017-06-08 RX ADMIN — ALBUTEROL SULFATE SCH PUFF: 90 AEROSOL, METERED RESPIRATORY (INHALATION) at 01:40

## 2017-06-08 RX ADMIN — ALBUTEROL SULFATE SCH PUFF: 90 AEROSOL, METERED RESPIRATORY (INHALATION) at 14:11

## 2017-06-08 RX ADMIN — ACETYLCYSTEINE SCH ML: 200 SOLUTION ORAL; RESPIRATORY (INHALATION) at 08:00

## 2017-06-08 RX ADMIN — ALBUTEROL SULFATE SCH PUFF: 90 AEROSOL, METERED RESPIRATORY (INHALATION) at 08:00

## 2017-06-08 RX ADMIN — ENOXAPARIN SODIUM SCH MG: 100 INJECTION SUBCUTANEOUS at 08:36

## 2017-06-08 NOTE — DS
Date/Time of Note


Date/Time of Note


DATE: 6/8/17 


TIME: 15:45





Discharge Summary


Admission/Discharge Info


Admit Date/Time


Jun 7, 2017 at 21:30


Discharge Date/Time





Final Diagnosis


1. COPD exacerbation, inhaler/levaquin, nebulizer prn, tapering dosage of 

prednisone


Patient Condition:  Stable


Hospital Course


he patient is a 67-year-old male with a history of asthma/COPD who presented to 

the emergency department complaining of shortness of breath.  Her symptoms have 

been progressively getting worse.  He denied chest pain, nausea, vomiting, fever

, chills, lightheadedness, palpitations.  When he presented to the ER, he was 

having difficulty breathing and he was placed on BiPAP.  His initial ABG shows 

a pH of 7.355, pCO2 of 45 and pO2 to 85, bicarbonate 24 while he was on BiPAP 

on 60% FIO2.  Currently, he is in the ER and also BiPAP and he actually looks 

comfortable and able to speak in full sentences. Physical exam with very low 

breath sounds diffusely. CXR no pulmonary infiltrates.





Patient is treated with nebulizer, SoluMedrol, and levaquin for COPD 

exacerbation, symptoms improved. He will be on nebulizer prn with Advair 

regularly, tapering dosage of prednisone and short term levaquin. Follow up 

with PCP in one week


Home Meds


Active Scripts


Levofloxacin* (Levaquin*) 250 Mg Tablet, 250 MG PO DAILY for 5 Days, TAB


   Prov:LAYNE VAZQUEZ MD         6/8/17


Ipratropium-Albuterol (Ipratropium-Albuterol) 0.5-3 Mg/3 Ml Ampul.neb, 3 ML 

INHALATION Q6 for 30 Days, #30 VIAL


   Prov:LAYNE VAZQUEZ MD         6/8/17


Prednisone* (Prednisone*) 20 Mg Tab, 20 MG PO DAILYbid for 5 Days, TAB


   Prov:LAYNE VAZQUEZ MD         6/8/17


Salmeterol Xinaf-Fluticasone* (Advair HFA*) 45/212 Aerosol Inhaler, 2 INH IH 

BID for 30 Days, #1 INHALER


   Prov:LAYNE VAZQUEZ MD         6/8/17


Albuterol Sulfate* (Ventolin HFA*) 18 Gm Hfa.aer.ad, 2 PUFF INHALATION Q4H, #1 

INHALER


   Prov:KATHARINA CHANG         5/23/17


Naproxen* (Naprosyn*) 500 Mg Tablet, 500 MG PO BID Y for PAIN AND/OR 

INFLAMMATION, #30 TAB


   Prov:KESHAWN GARY NP         8/7/16


Hydrocodone Bit-Acetaminophen* (Norco*)  Mg Tablet, 1 TAB PO Q6 Y for PAIN

, #7 TAB


   Prov:STEPHANIA LYLE MD         1/25/16


Reported Medications


Ipratropium Bromide* (Atrovent HFA*) 12.9 Gm Aer.w.adap, 2 PUFF INHALATION QID, 

#1 INHALER


   1/25/16


Discontinued Scripts


Prednisone* (Prednisone*) 20 Mg Tab, 40 MG PO DAILY for 4 Days, TAB


   Prov:KATHARINA CHANG         5/23/17


Sulfamethoxazole-Trimethoprim* (Bactrim* DS) 800-160 Mg Tab, 1 TAB PO BID for 7 

Days, TAB


   Prov:STEPHANIA LYLE MD         1/25/16


Cephalexin* (Keflex*) 500 Mg Capsule, 500 MG PO QID for 7 Days, CAP


   Prov:STEPHANIA LYLE MD         1/25/16


Follow-up Plan


PCP in one week


Primary Care Provider


LAYNE Rojo MD Jun 8, 2017 15:49

## 2019-09-24 ENCOUNTER — HOSPITAL ENCOUNTER (INPATIENT)
Dept: HOSPITAL 54 - ER | Age: 69
LOS: 5 days | Discharge: HOME | DRG: 418 | End: 2019-09-29
Attending: FAMILY MEDICINE | Admitting: FAMILY MEDICINE
Payer: MEDICARE

## 2019-09-24 VITALS — HEIGHT: 66 IN | WEIGHT: 187 LBS | BODY MASS INDEX: 30.05 KG/M2

## 2019-09-24 VITALS — DIASTOLIC BLOOD PRESSURE: 73 MMHG | SYSTOLIC BLOOD PRESSURE: 129 MMHG

## 2019-09-24 VITALS — SYSTOLIC BLOOD PRESSURE: 116 MMHG | DIASTOLIC BLOOD PRESSURE: 69 MMHG

## 2019-09-24 VITALS — DIASTOLIC BLOOD PRESSURE: 78 MMHG | SYSTOLIC BLOOD PRESSURE: 134 MMHG

## 2019-09-24 DIAGNOSIS — E44.1: ICD-10-CM

## 2019-09-24 DIAGNOSIS — F17.210: ICD-10-CM

## 2019-09-24 DIAGNOSIS — R73.9: ICD-10-CM

## 2019-09-24 DIAGNOSIS — N40.0: ICD-10-CM

## 2019-09-24 DIAGNOSIS — E88.09: ICD-10-CM

## 2019-09-24 DIAGNOSIS — K59.00: ICD-10-CM

## 2019-09-24 DIAGNOSIS — K80.00: Primary | ICD-10-CM

## 2019-09-24 DIAGNOSIS — E66.9: ICD-10-CM

## 2019-09-24 DIAGNOSIS — I25.10: ICD-10-CM

## 2019-09-24 DIAGNOSIS — K43.9: ICD-10-CM

## 2019-09-24 DIAGNOSIS — I10: ICD-10-CM

## 2019-09-24 DIAGNOSIS — J44.9: ICD-10-CM

## 2019-09-24 LAB
ALBUMIN SERPL BCP-MCNC: 3 G/DL (ref 3.4–5)
ALP SERPL-CCNC: 70 U/L (ref 46–116)
ALT SERPL W P-5'-P-CCNC: 47 U/L (ref 12–78)
AST SERPL W P-5'-P-CCNC: 25 U/L (ref 15–37)
BASOPHILS # BLD AUTO: 0 /CMM (ref 0–0.2)
BASOPHILS NFR BLD AUTO: 0.2 % (ref 0–2)
BILIRUB DIRECT SERPL-MCNC: 0.2 MG/DL (ref 0–0.2)
BILIRUB SERPL-MCNC: 0.5 MG/DL (ref 0.2–1)
BUN SERPL-MCNC: 12 MG/DL (ref 7–18)
CALCIUM SERPL-MCNC: 9.1 MG/DL (ref 8.5–10.1)
CHLORIDE SERPL-SCNC: 99 MMOL/L (ref 98–107)
CO2 SERPL-SCNC: 30 MMOL/L (ref 21–32)
COLOR UR: (no result)
CREAT SERPL-MCNC: 0.9 MG/DL (ref 0.6–1.3)
EOSINOPHIL NFR BLD AUTO: 0.1 % (ref 0–6)
GLUCOSE SERPL-MCNC: 119 MG/DL (ref 74–106)
HCT VFR BLD AUTO: 42 % (ref 39–51)
HGB BLD-MCNC: 14.2 G/DL (ref 13.5–17.5)
LIPASE SERPL-CCNC: 372 U/L (ref 73–393)
LYMPHOCYTES NFR BLD AUTO: 1.4 /CMM (ref 0.8–4.8)
LYMPHOCYTES NFR BLD AUTO: 13.1 % (ref 20–44)
MCHC RBC AUTO-ENTMCNC: 34 G/DL (ref 31–36)
MCV RBC AUTO: 96 FL (ref 80–96)
MONOCYTES NFR BLD AUTO: 1.1 /CMM (ref 0.1–1.3)
MONOCYTES NFR BLD AUTO: 10.7 % (ref 2–12)
NEUTROPHILS # BLD AUTO: 7.9 /CMM (ref 1.8–8.9)
NEUTROPHILS NFR BLD AUTO: 75.9 % (ref 43–81)
PH UR STRIP: 7 [PH] (ref 5–8)
PLATELET # BLD AUTO: 232 /CMM (ref 150–450)
POTASSIUM SERPL-SCNC: 4.3 MMOL/L (ref 3.5–5.1)
PROT SERPL-MCNC: 7.7 G/DL (ref 6.4–8.2)
PROT UR QL STRIP: 30 MG/DL
RBC # BLD AUTO: 4.4 MIL/UL (ref 4.5–6)
RBC #/AREA URNS HPF: (no result) /HPF (ref 0–2)
SODIUM SERPL-SCNC: 136 MMOL/L (ref 136–145)
UROBILINOGEN UR STRIP-MCNC: 1 EU/DL
WBC #/AREA URNS HPF: (no result) /HPF (ref 0–3)
WBC NRBC COR # BLD AUTO: 10.4 K/UL (ref 4.3–11)

## 2019-09-24 PROCEDURE — A9537 TC99M MEBROFENIN: HCPCS

## 2019-09-24 PROCEDURE — A6253 ABSORPT DRG > 48 SQ IN W/O B: HCPCS

## 2019-09-24 PROCEDURE — G0378 HOSPITAL OBSERVATION PER HR: HCPCS

## 2019-09-24 RX ADMIN — Medication PRN MG: at 23:25

## 2019-09-24 RX ADMIN — PANCRELIPASE SCH EACH: 4200; 24600; 14200 CAPSULE, DELAYED RELEASE ORAL at 18:28

## 2019-09-24 RX ADMIN — TAMSULOSIN HYDROCHLORIDE SCH MG: 0.4 CAPSULE ORAL at 21:58

## 2019-09-24 RX ADMIN — SODIUM CHLORIDE PRN MLS/HR: 9 INJECTION, SOLUTION INTRAVENOUS at 21:06

## 2019-09-24 RX ADMIN — ALBUTEROL SULFATE PRN MG: 2.5 SOLUTION RESPIRATORY (INHALATION) at 23:25

## 2019-09-24 RX ADMIN — PIPERACILLIN SODIUM AND TAZOBACTAM SODIUM SCH MLS/HR: .375; 3 INJECTION, POWDER, LYOPHILIZED, FOR SOLUTION INTRAVENOUS at 23:45

## 2019-09-24 NOTE — NUR
PATIENT AWAKE ALERT ARMENIA SPEAKING Karly @ BEDSIDE FOR INTREPRETER FAMILY 
MADE AWARE PLAN OF CARE ,ULTRASOUND @ BEDSIDE

## 2019-09-24 NOTE — NUR
MS BLOOM NOTES

 CALLED ON CALL MD REGARDING PT COMPLAINT. SPOKE TO DR WALTERS GOT ORDERED 
ALBUTEROL/ATROVENT UNIT DOSE Q 4HRS  AND PRN. 

-------------------------------------------------------------------------------

Addendum: 09/25/19 at 0006 by YOLIE GLEZ LVN

-------------------------------------------------------------------------------

OFFERED TO THE PATIENT THE BREO ELLIPTA FOR HIS ASTHMA ,INHALATION TREATMENT BUT PT REFUSED 
. HE CALLED HIS DAUGHTER NOA HIS CELLPHONE AND DAUGHTER TOLD ME  THAT PT DOESN'T LIKE THAT 
MEDICATION INSTEAD HE WANTS DIFF. WANT.

## 2019-09-24 NOTE — NUR
Received patient awake A/O x4 Kinyarwanda speaking only. Patient reported he has no pain at 
this time.Pain med was given in ER. Patient has IV assess LAC G20 , flushing well. Skin 
intact , gait is steady. No belongings with the patient. Patient adm. to [facility] , with diagnosis 
acute Cholecystitis . NPO status except meds . Patient oriented to the unit and room . 
Safety precautions implemented, call light within reach. Will endorse to next shift for YAMILA.

## 2019-09-24 NOTE — NUR
patient awake alert compali of abdominal pain denies nausea and vomiting ,xray 
done blood draws obtained continue to monitor

## 2019-09-24 NOTE — NUR
MS LVN INITIAL NOTES

 RECEIVED REPORT FROM AM NURSE WHILE DOING ROUNDS TO PT  ROOM AND AT THE SAME TIME DR BRYANT SANTANA CAME AND WANTS TO SPEAK TO THE PATIENT. FLYNN/RN HELPED TO TRANSLATED BECAUSE PT 
ONLY SPEAK Danish. DENIES ANY PAIN AT THIS TIME ONLY WHEN PRESSING HIS ABDOMEN . NO N/V AS 
WELL. GOT ORDERED TO CONTINUE MONITORING AND KEPT PT NPO AT THIS TIME. ENCOURAGE PT TO USED 
THE CALL LIGHT SYSTEM IF HE NEEDS SOME HELP OR NEEDS ASSISTANCE. KEPT HIM COMFORTABLE AND 
SAFETY AT ALL TIMES. PLACE CALL LIGHT AT REACH.

## 2019-09-25 VITALS — DIASTOLIC BLOOD PRESSURE: 56 MMHG | SYSTOLIC BLOOD PRESSURE: 96 MMHG

## 2019-09-25 VITALS — DIASTOLIC BLOOD PRESSURE: 74 MMHG | SYSTOLIC BLOOD PRESSURE: 118 MMHG

## 2019-09-25 VITALS — DIASTOLIC BLOOD PRESSURE: 63 MMHG | SYSTOLIC BLOOD PRESSURE: 112 MMHG

## 2019-09-25 LAB
BASOPHILS # BLD AUTO: 0 /CMM (ref 0–0.2)
BASOPHILS NFR BLD AUTO: 0.1 % (ref 0–2)
BUN SERPL-MCNC: 16 MG/DL (ref 7–18)
CALCIUM SERPL-MCNC: 8.4 MG/DL (ref 8.5–10.1)
CHLORIDE SERPL-SCNC: 103 MMOL/L (ref 98–107)
CHOLEST SERPL-MCNC: 143 MG/DL (ref ?–200)
CO2 SERPL-SCNC: 26 MMOL/L (ref 21–32)
CREAT SERPL-MCNC: 0.9 MG/DL (ref 0.6–1.3)
EOSINOPHIL NFR BLD AUTO: 0.2 % (ref 0–6)
GLUCOSE SERPL-MCNC: 120 MG/DL (ref 74–106)
HCT VFR BLD AUTO: 38 % (ref 39–51)
HDLC SERPL-MCNC: 39 MG/DL (ref 40–60)
HGB BLD-MCNC: 13 G/DL (ref 13.5–17.5)
LDLC SERPL DIRECT ASSAY-MCNC: 93 MG/DL (ref 0–99)
LYMPHOCYTES NFR BLD AUTO: 1.2 /CMM (ref 0.8–4.8)
LYMPHOCYTES NFR BLD AUTO: 11.9 % (ref 20–44)
MAGNESIUM SERPL-MCNC: 2 MG/DL (ref 1.8–2.4)
MCHC RBC AUTO-ENTMCNC: 34 G/DL (ref 31–36)
MCV RBC AUTO: 96 FL (ref 80–96)
MONOCYTES NFR BLD AUTO: 1.2 /CMM (ref 0.1–1.3)
MONOCYTES NFR BLD AUTO: 11.4 % (ref 2–12)
NEUTROPHILS # BLD AUTO: 7.8 /CMM (ref 1.8–8.9)
NEUTROPHILS NFR BLD AUTO: 76.4 % (ref 43–81)
PHOSPHATE SERPL-MCNC: 3.8 MG/DL (ref 2.5–4.9)
PLATELET # BLD AUTO: 248 /CMM (ref 150–450)
POTASSIUM SERPL-SCNC: 4 MMOL/L (ref 3.5–5.1)
RBC # BLD AUTO: 3.98 MIL/UL (ref 4.5–6)
SODIUM SERPL-SCNC: 139 MMOL/L (ref 136–145)
TRIGL SERPL-MCNC: 80 MG/DL (ref 30–150)
WBC NRBC COR # BLD AUTO: 10.2 K/UL (ref 4.3–11)

## 2019-09-25 RX ADMIN — MECLIZINE HYDROCLORIDE SCH MG: 25 TABLET ORAL at 08:16

## 2019-09-25 RX ADMIN — PANCRELIPASE SCH EACH: 4200; 24600; 14200 CAPSULE, DELAYED RELEASE ORAL at 14:46

## 2019-09-25 RX ADMIN — MECLIZINE HYDROCLORIDE SCH MG: 25 TABLET ORAL at 17:12

## 2019-09-25 RX ADMIN — Medication SCH MG: at 08:17

## 2019-09-25 RX ADMIN — DONEPEZIL HYDROCHLORIDE SCH MG: 5 TABLET ORAL at 08:16

## 2019-09-25 RX ADMIN — DUTASTERIDE SCH MG: 0.5 CAPSULE, LIQUID FILLED ORAL at 08:16

## 2019-09-25 RX ADMIN — Medication PRN MG: at 21:21

## 2019-09-25 RX ADMIN — ESCITALOPRAM OXALATE SCH MG: 10 TABLET, FILM COATED ORAL at 08:15

## 2019-09-25 RX ADMIN — ATORVASTATIN CALCIUM SCH MG: 10 TABLET, FILM COATED ORAL at 08:16

## 2019-09-25 RX ADMIN — PANCRELIPASE SCH EACH: 4200; 24600; 14200 CAPSULE, DELAYED RELEASE ORAL at 08:16

## 2019-09-25 RX ADMIN — PIPERACILLIN SODIUM AND TAZOBACTAM SODIUM SCH MLS/HR: .375; 3 INJECTION, POWDER, LYOPHILIZED, FOR SOLUTION INTRAVENOUS at 16:37

## 2019-09-25 RX ADMIN — TAMSULOSIN HYDROCHLORIDE SCH MG: 0.4 CAPSULE ORAL at 22:03

## 2019-09-25 RX ADMIN — PANCRELIPASE SCH EACH: 4200; 24600; 14200 CAPSULE, DELAYED RELEASE ORAL at 18:47

## 2019-09-25 RX ADMIN — PIPERACILLIN SODIUM AND TAZOBACTAM SODIUM SCH MLS/HR: .375; 3 INJECTION, POWDER, LYOPHILIZED, FOR SOLUTION INTRAVENOUS at 08:22

## 2019-09-25 RX ADMIN — ALBUTEROL SULFATE PRN MG: 2.5 SOLUTION RESPIRATORY (INHALATION) at 21:21

## 2019-09-25 RX ADMIN — FLUTICASONE FUROATE AND VILANTEROL TRIFENATATE SCH EACH: 100; 25 POWDER RESPIRATORY (INHALATION) at 08:22

## 2019-09-25 RX ADMIN — MONTELUKAST SODIUM SCH MG: 10 TABLET, FILM COATED ORAL at 08:15

## 2019-09-25 NOTE — NUR
ms lvn closing notes

 pt awake and alert watching tv at this time . denies any pain or any discomfort. no n/v 
noted.as well, IVF NS at 75ml/hr still infusing.  slept well after breathing tx . all needs 
met and all due meds given. kept him warm and comfortable at all times. still NPO except 
meds. Endorse to am nurse for continuity of care. place call light at reach.

## 2019-09-25 NOTE — NUR
MS LVN INITIAL NOTES

 RECEIVED REPORT FROM AM NURSE AND SEEN PT IN BED WITH IVF OF NS AT 75ML./HR INFUSING AT 
THIS TIME, DENIES ANY PAIN OR ANY DISCOMFORT. NO N/V NOTED. PT STILL NPO AND REQUESTED TO IF 
HE CAN HAVE SOMETHING TO EAT. SPOKE TO HIM THAT STILL WAITING FOR THE MD RESPONSE. KEPT HIM 
WARM AND COMFORTABLE AT ALL TIMES. PLACE CALL LIGHT AT REACH. WILL CONTINUE MONITORING.

## 2019-09-25 NOTE — NUR
MS LVN NOTES

 PT AWAKE AND ALERT AND NOTICED  SMILE ON HIS FACE AFTER BREATHING TX. HE STATES "THANK YOU" 
 AND NO SIGNS OF ANY ACUTE DISTRESS AT THIS TIME. IVF STILL INFUSING. WILL CONTINUE 
MONITORING. PLACE CALL LIGHT AT REACH.

## 2019-09-25 NOTE — NUR
MS RN OPENING NOTES



RECEIVED PATIENT IN BED ALERT AND AWAKE ORIENTED X4, Belarusian/Slovak SPEAKING WITH 
 UTILIZED. NO C/O OF SOB. DENIES ANY C/O PAIN NOR DISCOMFORT AT THIS TIME. RAC #20 
INTACT AND PATENT INFUSING NS @ 75ML/HR. BED IN LOWEST POSITION, LOCKED. BRP, AMBULATORY 
WITH STEADY GAIT. CALL LIGHT WITHIN REACH.

## 2019-09-25 NOTE — NUR
MS RN CLOSING NOTES



 ALERT AND AWAKE ORIENTED X4.  WIFE AT BEDSIDE. Monegasque/Frisian SPEAKING WITH  
UTILIZED. NO S/S OF RESPIRATORY DISTRESS. DENIES ANY C/O PAIN NOR DISCOMFORT AT THIS TIME. 
DENIES ANY C/O ABD PAIN AND N/V DURING THE SHIFT.  RAC #20 INTACT AND PATENT INFUSING NS @ 
75ML/HR. HIDA SCAN COMPLETED, DIONICIO WELL.  IN NO APPARENT DISTRESS. BED IN LOWEST POSITION, 
LOCKED.  CALL LIGHT WITHIN REACH.

## 2019-09-25 NOTE — NUR
MS RN NOTES



RELAYED HIDA SCAN RESULT TO DAJA MIJARES NP. PATIENT STATES HE WANTS TO EAT. AWAITING FO 
RESPONSE. ENDORSED TO ONCOMING SHIFT.

## 2019-09-25 NOTE — NUR
MS LVN NOTES

 ROUTINE MEDS GIVEN . STILL NPO EXCEPT MEDS. IVF STILL INFUSING . KEPT HIM WARM AND 
COMFORTABLE AT ALL TIMES. PLACE CALL LIGHT AT REACH.WILL CONTINUE MONITORING.

## 2019-09-25 NOTE — NUR
ms lvn notes

 checked pt sleeping comfortably in bed without any distress or any discomfort noted. kept 
him warm and comfortable at all times. IVF NS infusing at 75ml/hr . will continue 
monitoring.

## 2019-09-26 VITALS — SYSTOLIC BLOOD PRESSURE: 111 MMHG | DIASTOLIC BLOOD PRESSURE: 54 MMHG

## 2019-09-26 VITALS — DIASTOLIC BLOOD PRESSURE: 60 MMHG | SYSTOLIC BLOOD PRESSURE: 104 MMHG

## 2019-09-26 VITALS — SYSTOLIC BLOOD PRESSURE: 114 MMHG | DIASTOLIC BLOOD PRESSURE: 63 MMHG

## 2019-09-26 VITALS — DIASTOLIC BLOOD PRESSURE: 57 MMHG | SYSTOLIC BLOOD PRESSURE: 98 MMHG

## 2019-09-26 VITALS — SYSTOLIC BLOOD PRESSURE: 120 MMHG | DIASTOLIC BLOOD PRESSURE: 51 MMHG

## 2019-09-26 LAB
BASOPHILS # BLD AUTO: 0 /CMM (ref 0–0.2)
BASOPHILS NFR BLD AUTO: 0.2 % (ref 0–2)
BUN SERPL-MCNC: 14 MG/DL (ref 7–18)
CALCIUM SERPL-MCNC: 8.3 MG/DL (ref 8.5–10.1)
CHLORIDE SERPL-SCNC: 101 MMOL/L (ref 98–107)
CO2 SERPL-SCNC: 27 MMOL/L (ref 21–32)
CREAT SERPL-MCNC: 0.9 MG/DL (ref 0.6–1.3)
EOSINOPHIL NFR BLD AUTO: 0.3 % (ref 0–6)
GLUCOSE SERPL-MCNC: 102 MG/DL (ref 74–106)
HCT VFR BLD AUTO: 36 % (ref 39–51)
HGB BLD-MCNC: 11.9 G/DL (ref 13.5–17.5)
LYMPHOCYTES NFR BLD AUTO: 1.4 /CMM (ref 0.8–4.8)
LYMPHOCYTES NFR BLD AUTO: 11.6 % (ref 20–44)
MCHC RBC AUTO-ENTMCNC: 34 G/DL (ref 31–36)
MCV RBC AUTO: 95 FL (ref 80–96)
MONOCYTES NFR BLD AUTO: 0.9 /CMM (ref 0.1–1.3)
MONOCYTES NFR BLD AUTO: 7.8 % (ref 2–12)
NEUTROPHILS # BLD AUTO: 9.3 /CMM (ref 1.8–8.9)
NEUTROPHILS NFR BLD AUTO: 80.1 % (ref 43–81)
PLATELET # BLD AUTO: 244 /CMM (ref 150–450)
POTASSIUM SERPL-SCNC: 3.9 MMOL/L (ref 3.5–5.1)
RBC # BLD AUTO: 3.73 MIL/UL (ref 4.5–6)
SODIUM SERPL-SCNC: 138 MMOL/L (ref 136–145)
WBC NRBC COR # BLD AUTO: 11.6 K/UL (ref 4.3–11)

## 2019-09-26 PROCEDURE — 0FT44ZZ RESECTION OF GALLBLADDER, PERCUTANEOUS ENDOSCOPIC APPROACH: ICD-10-PCS | Performed by: SURGERY

## 2019-09-26 PROCEDURE — 0FB04ZX EXCISION OF LIVER, PERCUTANEOUS ENDOSCOPIC APPROACH, DIAGNOSTIC: ICD-10-PCS | Performed by: SURGERY

## 2019-09-26 RX ADMIN — PIPERACILLIN SODIUM AND TAZOBACTAM SODIUM SCH MLS/HR: .375; 3 INJECTION, POWDER, LYOPHILIZED, FOR SOLUTION INTRAVENOUS at 08:39

## 2019-09-26 RX ADMIN — SODIUM CHLORIDE PRN MLS/HR: 9 INJECTION, SOLUTION INTRAVENOUS at 06:19

## 2019-09-26 RX ADMIN — ATORVASTATIN CALCIUM SCH MG: 10 TABLET, FILM COATED ORAL at 08:38

## 2019-09-26 RX ADMIN — PANCRELIPASE SCH EACH: 4200; 24600; 14200 CAPSULE, DELAYED RELEASE ORAL at 07:33

## 2019-09-26 RX ADMIN — TAMSULOSIN HYDROCHLORIDE SCH MG: 0.4 CAPSULE ORAL at 22:00

## 2019-09-26 RX ADMIN — PIPERACILLIN SODIUM AND TAZOBACTAM SODIUM SCH MLS/HR: .375; 3 INJECTION, POWDER, LYOPHILIZED, FOR SOLUTION INTRAVENOUS at 00:11

## 2019-09-26 RX ADMIN — PIPERACILLIN SODIUM AND TAZOBACTAM SODIUM SCH MLS/HR: .375; 3 INJECTION, POWDER, LYOPHILIZED, FOR SOLUTION INTRAVENOUS at 23:41

## 2019-09-26 RX ADMIN — MONTELUKAST SODIUM SCH MG: 10 TABLET, FILM COATED ORAL at 08:39

## 2019-09-26 RX ADMIN — Medication SCH MG: at 08:38

## 2019-09-26 RX ADMIN — MECLIZINE HYDROCLORIDE SCH MG: 25 TABLET ORAL at 17:00

## 2019-09-26 RX ADMIN — ESCITALOPRAM OXALATE SCH MG: 10 TABLET, FILM COATED ORAL at 08:38

## 2019-09-26 RX ADMIN — PANCRELIPASE SCH EACH: 4200; 24600; 14200 CAPSULE, DELAYED RELEASE ORAL at 17:01

## 2019-09-26 RX ADMIN — DUTASTERIDE SCH MG: 0.5 CAPSULE, LIQUID FILLED ORAL at 08:38

## 2019-09-26 RX ADMIN — Medication PRN MG: at 16:07

## 2019-09-26 RX ADMIN — DONEPEZIL HYDROCHLORIDE SCH MG: 5 TABLET ORAL at 08:38

## 2019-09-26 RX ADMIN — MECLIZINE HYDROCLORIDE SCH MG: 25 TABLET ORAL at 08:39

## 2019-09-26 RX ADMIN — PANCRELIPASE SCH EACH: 4200; 24600; 14200 CAPSULE, DELAYED RELEASE ORAL at 13:00

## 2019-09-26 RX ADMIN — FLUTICASONE FUROATE AND VILANTEROL TRIFENATATE SCH EACH: 100; 25 POWDER RESPIRATORY (INHALATION) at 08:37

## 2019-09-26 RX ADMIN — ALBUTEROL SULFATE PRN MG: 2.5 SOLUTION RESPIRATORY (INHALATION) at 16:07

## 2019-09-26 RX ADMIN — PIPERACILLIN SODIUM AND TAZOBACTAM SODIUM SCH MLS/HR: .375; 3 INJECTION, POWDER, LYOPHILIZED, FOR SOLUTION INTRAVENOUS at 16:47

## 2019-09-26 NOTE — NUR
ms lvn notes

 checked pt in his room sleeping comfortably in bed without any acute distress or any 
discomfort noted. still with IVF NS at 75ml/hr. kept him warm and comfortable at all times. 
place call light at reach.

## 2019-09-26 NOTE — NUR
MS RN OPENING NOTES



RECEIVED PATIENT IN BED, ALERT AND AWAKE ORIENTED X4. PATIENT STATED IT WAS VERY COLD LAST 
NIGHT AND WAS COVERED UP WITH SEVERAL BLANKETS. NOTED TEMP .3. REMOVED BLANKETS WITH 
COOLING MEASURES PROVIDED. TYLENOL 650MG GIVEN PO DIONICIO WELL. PATIENT DENIES ANY C/O PAIN NOR 
DISCOMFORT AT THIS TIME. NO SOB. RIGHT AC # 20 INTACT AND PATENT INFUSING NS @ 75ML/HR DIONICIO 
WELL. BED IN LOWEST POSITION, LOCKED. CALL LIGHT WITHIN REACH. ABLE TO VERBALIZE NEEDS.

## 2019-09-26 NOTE — NUR
ms lvn closing notes\

 pt awake and alert denies any pain or any discomfort. no signs of any acute distress. all 
due meds given and all needs met. stable deirck the night. endorse to am nurse for continuity 
of care. place call light at reach.

## 2019-09-26 NOTE — NUR
MS RN NOTES



PATIENT SEEN BY DAJA MIJARES AND DISCUSSED REGARDING SURGERY FOR REMOVAL OF GALLBLADDER AND 
EXPLAINED RISKS AND BENEFITS VS MEDICAL MANAGEMENT WITH  UTILIZED. PER PATIENT HE 
WILL DISCUSS WITH WIFE AND WILL INFORM NURSE IF PATIENT DECIDED TO DO THE SURGERY.

## 2019-09-26 NOTE — NUR
RN OPEN NOTES



RECEIVED PATIENT FROM OR, AWAKE IN BED WITH FAMILY AT BEDSIDE. A/OX4. NO SIGNS OF DISTRESS 
OR DISCOMFORT. BREATHING EVEN AND UNLABORED. PATIENT S/P LAPROSCOPIC CHOLECYSTECTOMY. ON 
2LPM O2 VIA NC. IV ACCESS IN RAC WITH NS INFUSING TO GRAVITY. PATIENT DENIES ANY PAIN AT 
THIS TIME. HAS DESTINY DRAIN INTACT, DRAINING SANGUINEOUS FLUID WITH APPROX 25ML NOTED IN DRAIN. 
BED IN LOW LOCKED POSITION WITH SIDE RAILS X2. CALL LIGHT WITHIN REACH. WILL CONTINUE TO 
MONITOR.

## 2019-09-26 NOTE — NUR
MS RN NOTES



PATIENT LEFT UNIT FOR OR FOR LAPAROSCOPIC CHOLECYSTECTOMY POSSIBLE OPEN POSSIBLE 
INTRAOPERATIVE CHOLIANGIOGRAM POSSIBLE LIVER BIOPSY. PATIENT'S WIFE AND BROTHER IN LAW IN 
WAITING AREA.

## 2019-09-27 VITALS — DIASTOLIC BLOOD PRESSURE: 68 MMHG | SYSTOLIC BLOOD PRESSURE: 110 MMHG

## 2019-09-27 VITALS — DIASTOLIC BLOOD PRESSURE: 54 MMHG | SYSTOLIC BLOOD PRESSURE: 108 MMHG

## 2019-09-27 VITALS — SYSTOLIC BLOOD PRESSURE: 100 MMHG | DIASTOLIC BLOOD PRESSURE: 59 MMHG

## 2019-09-27 VITALS — SYSTOLIC BLOOD PRESSURE: 108 MMHG | DIASTOLIC BLOOD PRESSURE: 54 MMHG

## 2019-09-27 LAB
ALBUMIN SERPL BCP-MCNC: 2.3 G/DL (ref 3.4–5)
ALP SERPL-CCNC: 68 U/L (ref 46–116)
ALT SERPL W P-5'-P-CCNC: 51 U/L (ref 12–78)
AST SERPL W P-5'-P-CCNC: 32 U/L (ref 15–37)
BASOPHILS # BLD AUTO: 0 /CMM (ref 0–0.2)
BASOPHILS NFR BLD AUTO: 0.1 % (ref 0–2)
BILIRUB SERPL-MCNC: 0.3 MG/DL (ref 0.2–1)
BUN SERPL-MCNC: 14 MG/DL (ref 7–18)
CALCIUM SERPL-MCNC: 8.2 MG/DL (ref 8.5–10.1)
CHLORIDE SERPL-SCNC: 103 MMOL/L (ref 98–107)
CO2 SERPL-SCNC: 28 MMOL/L (ref 21–32)
CREAT SERPL-MCNC: 0.9 MG/DL (ref 0.6–1.3)
EOSINOPHIL NFR BLD AUTO: 0.1 % (ref 0–6)
GLUCOSE SERPL-MCNC: 149 MG/DL (ref 74–106)
HCT VFR BLD AUTO: 35 % (ref 39–51)
HGB BLD-MCNC: 11.9 G/DL (ref 13.5–17.5)
LYMPHOCYTES NFR BLD AUTO: 1 /CMM (ref 0.8–4.8)
LYMPHOCYTES NFR BLD AUTO: 10.5 % (ref 20–44)
MAGNESIUM SERPL-MCNC: 2.2 MG/DL (ref 1.8–2.4)
MCHC RBC AUTO-ENTMCNC: 34 G/DL (ref 31–36)
MCV RBC AUTO: 96 FL (ref 80–96)
MONOCYTES NFR BLD AUTO: 1.1 /CMM (ref 0.1–1.3)
MONOCYTES NFR BLD AUTO: 11 % (ref 2–12)
NEUTROPHILS # BLD AUTO: 7.6 /CMM (ref 1.8–8.9)
NEUTROPHILS NFR BLD AUTO: 78.3 % (ref 43–81)
PHOSPHATE SERPL-MCNC: 1.9 MG/DL (ref 2.5–4.9)
PLATELET # BLD AUTO: 255 /CMM (ref 150–450)
POTASSIUM SERPL-SCNC: 3.9 MMOL/L (ref 3.5–5.1)
PROT SERPL-MCNC: 6.4 G/DL (ref 6.4–8.2)
RBC # BLD AUTO: 3.68 MIL/UL (ref 4.5–6)
SODIUM SERPL-SCNC: 138 MMOL/L (ref 136–145)
WBC NRBC COR # BLD AUTO: 9.7 K/UL (ref 4.3–11)

## 2019-09-27 RX ADMIN — ALBUTEROL SULFATE PRN MG: 2.5 SOLUTION RESPIRATORY (INHALATION) at 13:17

## 2019-09-27 RX ADMIN — ATORVASTATIN CALCIUM SCH MG: 10 TABLET, FILM COATED ORAL at 08:13

## 2019-09-27 RX ADMIN — DONEPEZIL HYDROCHLORIDE SCH MG: 5 TABLET ORAL at 08:13

## 2019-09-27 RX ADMIN — MECLIZINE HYDROCLORIDE SCH MG: 25 TABLET ORAL at 17:52

## 2019-09-27 RX ADMIN — TAMSULOSIN HYDROCHLORIDE SCH MG: 0.4 CAPSULE ORAL at 21:04

## 2019-09-27 RX ADMIN — PIPERACILLIN SODIUM AND TAZOBACTAM SODIUM SCH MLS/HR: .375; 3 INJECTION, POWDER, LYOPHILIZED, FOR SOLUTION INTRAVENOUS at 15:50

## 2019-09-27 RX ADMIN — Medication PRN EACH: at 12:28

## 2019-09-27 RX ADMIN — PIPERACILLIN SODIUM AND TAZOBACTAM SODIUM SCH MLS/HR: .375; 3 INJECTION, POWDER, LYOPHILIZED, FOR SOLUTION INTRAVENOUS at 08:13

## 2019-09-27 RX ADMIN — PANCRELIPASE SCH EACH: 4200; 24600; 14200 CAPSULE, DELAYED RELEASE ORAL at 08:13

## 2019-09-27 RX ADMIN — DUTASTERIDE SCH MG: 0.5 CAPSULE, LIQUID FILLED ORAL at 08:13

## 2019-09-27 RX ADMIN — Medication SCH MG: at 08:14

## 2019-09-27 RX ADMIN — FLUTICASONE FUROATE AND VILANTEROL TRIFENATATE SCH EACH: 100; 25 POWDER RESPIRATORY (INHALATION) at 08:14

## 2019-09-27 RX ADMIN — Medication PRN EACH: at 21:04

## 2019-09-27 RX ADMIN — MONTELUKAST SODIUM SCH MG: 10 TABLET, FILM COATED ORAL at 08:13

## 2019-09-27 RX ADMIN — MECLIZINE HYDROCLORIDE SCH MG: 25 TABLET ORAL at 08:13

## 2019-09-27 RX ADMIN — PANCRELIPASE SCH EACH: 4200; 24600; 14200 CAPSULE, DELAYED RELEASE ORAL at 17:52

## 2019-09-27 RX ADMIN — ESCITALOPRAM OXALATE SCH MG: 10 TABLET, FILM COATED ORAL at 08:13

## 2019-09-27 RX ADMIN — Medication PRN MG: at 13:17

## 2019-09-27 RX ADMIN — ALBUTEROL SULFATE PRN MG: 2.5 SOLUTION RESPIRATORY (INHALATION) at 21:17

## 2019-09-27 RX ADMIN — PANCRELIPASE SCH EACH: 4200; 24600; 14200 CAPSULE, DELAYED RELEASE ORAL at 12:28

## 2019-09-27 NOTE — NUR
PATIENT RESTING IN BED. A/O X4 , BREATHING UNLABELED AND EVEN ON ROOM AIR. PATIENT ON FULL 
LIQUID DIET TOLERATED WELL. ALL NEEDS ATTENDED. SAFETY PRECAUTIONS IN PLACE , CALL LIGHT 
WITHIN REACH. WILL ENDORSE TO NEXT SHIFT FOR YAMILA

## 2019-09-27 NOTE — NUR
RN OPEN NOTES



RECEIVED PATIENT AWAKE IN BED. A/OX4. NO SIGNS OF DISTRESS OR DISCOMFORT. BREATHING EVEN AND 
UNLABORED. IV ACCESS IN RAC WITH NS INFUSING, PATENT AND INTACT, NO SIGNS OF REDNESS OR 
INFILTRATION. PATIENT DENIES ANY PAIN AT THIS TIME. HAS DESTINY DRAIN INTACT, DRAINING 
SANGUINEOUS FLUID. BED IN LOW LOCKED POSITION WITH SIDE RAILS X2. CALL LIGHT WITHIN REACH. 
WILL CONTINUE TO MONITOR.

## 2019-09-27 NOTE — NUR
RN CLOSING NOTES



PATIENT RESTING COMFORTABLY IN BED. A/OX4. NO SIGNS OF DISTRESS OR DISCOMFORT. BREATHING 
EVEN AND UNLABORED. ON 2LPM O2 VIA NC.  IV ACCESS IN RAC WITH NS INFUSING, PATENT AND 
INTACT, NO SIGNS OF REDNESS OR INFILTRATION. PATIENT DENIES ANY PAIN AT THIS TIME. HAS DESTINY 
DRAIN INTACT, DRAINING SANGUINEOUS FLUID WITH APPROX 75ML OUTPUT NOTED. ALL NEEDS MET. NO 
SIGNIFICANT CHANGES THROUGH THE NIGHT. BED IN LOW LOCKED POSITION WITH SIDE RAILS X2. CALL 
LIGHT WITHIN REACH. WILL ENDORSE TO AM SHIFT FOR YAMILA.

## 2019-09-28 VITALS — DIASTOLIC BLOOD PRESSURE: 65 MMHG | SYSTOLIC BLOOD PRESSURE: 104 MMHG

## 2019-09-28 VITALS — DIASTOLIC BLOOD PRESSURE: 79 MMHG | SYSTOLIC BLOOD PRESSURE: 121 MMHG

## 2019-09-28 VITALS — SYSTOLIC BLOOD PRESSURE: 109 MMHG | DIASTOLIC BLOOD PRESSURE: 64 MMHG

## 2019-09-28 RX ADMIN — DONEPEZIL HYDROCHLORIDE SCH MG: 5 TABLET ORAL at 08:35

## 2019-09-28 RX ADMIN — Medication PRN MG: at 09:47

## 2019-09-28 RX ADMIN — ATORVASTATIN CALCIUM SCH MG: 10 TABLET, FILM COATED ORAL at 08:34

## 2019-09-28 RX ADMIN — PIPERACILLIN SODIUM AND TAZOBACTAM SODIUM SCH MLS/HR: .375; 3 INJECTION, POWDER, LYOPHILIZED, FOR SOLUTION INTRAVENOUS at 16:16

## 2019-09-28 RX ADMIN — MECLIZINE HYDROCLORIDE SCH MG: 25 TABLET ORAL at 17:03

## 2019-09-28 RX ADMIN — DUTASTERIDE SCH MG: 0.5 CAPSULE, LIQUID FILLED ORAL at 08:34

## 2019-09-28 RX ADMIN — ESCITALOPRAM OXALATE SCH MG: 10 TABLET, FILM COATED ORAL at 08:30

## 2019-09-28 RX ADMIN — Medication SCH MG: at 08:34

## 2019-09-28 RX ADMIN — PANCRELIPASE SCH EACH: 4200; 24600; 14200 CAPSULE, DELAYED RELEASE ORAL at 17:03

## 2019-09-28 RX ADMIN — FLUTICASONE FUROATE AND VILANTEROL TRIFENATATE SCH EACH: 100; 25 POWDER RESPIRATORY (INHALATION) at 09:00

## 2019-09-28 RX ADMIN — ALBUTEROL SULFATE PRN MG: 2.5 SOLUTION RESPIRATORY (INHALATION) at 09:47

## 2019-09-28 RX ADMIN — PIPERACILLIN SODIUM AND TAZOBACTAM SODIUM SCH MLS/HR: .375; 3 INJECTION, POWDER, LYOPHILIZED, FOR SOLUTION INTRAVENOUS at 23:30

## 2019-09-28 RX ADMIN — TAMSULOSIN HYDROCHLORIDE SCH MG: 0.4 CAPSULE ORAL at 21:04

## 2019-09-28 RX ADMIN — Medication PRN EACH: at 21:04

## 2019-09-28 RX ADMIN — PIPERACILLIN SODIUM AND TAZOBACTAM SODIUM SCH MLS/HR: .375; 3 INJECTION, POWDER, LYOPHILIZED, FOR SOLUTION INTRAVENOUS at 00:05

## 2019-09-28 RX ADMIN — MONTELUKAST SODIUM SCH MG: 10 TABLET, FILM COATED ORAL at 08:34

## 2019-09-28 RX ADMIN — MECLIZINE HYDROCLORIDE SCH MG: 25 TABLET ORAL at 08:30

## 2019-09-28 RX ADMIN — PIPERACILLIN SODIUM AND TAZOBACTAM SODIUM SCH MLS/HR: .375; 3 INJECTION, POWDER, LYOPHILIZED, FOR SOLUTION INTRAVENOUS at 08:14

## 2019-09-28 RX ADMIN — Medication PRN EACH: at 05:47

## 2019-09-28 RX ADMIN — PANCRELIPASE SCH EACH: 4200; 24600; 14200 CAPSULE, DELAYED RELEASE ORAL at 13:16

## 2019-09-28 RX ADMIN — ALBUTEROL SULFATE PRN MG: 2.5 SOLUTION RESPIRATORY (INHALATION) at 21:38

## 2019-09-28 RX ADMIN — Medication PRN MG: at 21:38

## 2019-09-28 RX ADMIN — PANCRELIPASE SCH EACH: 4200; 24600; 14200 CAPSULE, DELAYED RELEASE ORAL at 08:30

## 2019-09-28 RX ADMIN — Medication PRN EACH: at 17:04

## 2019-09-28 RX ADMIN — SODIUM CHLORIDE PRN MLS/HR: 9 INJECTION, SOLUTION INTRAVENOUS at 00:05

## 2019-09-28 NOTE — NUR
RN CLOSING NOTES



PATIENT RESTING COMFORTABLY IN BED. A/OX4. NO SIGNS OF DISTRESS OR DISCOMFORT. BREATHING 
EVEN AND UNLABORED. IV ACCESS IN RAC WITH NS INFUSING, PATENT AND INTACT, NO SIGNS OF 
REDNESS OR INFILTRATION. PATIENT DENIES ANY PAIN AT THIS TIME. HAS DESTINY DRAIN INTACT, DRAINING 
SANGUINEOUS FLUID WITH APPROX 25ML OUTPUT NOTED. ALL NEEDS MET. NO SIGNIFICANT CHANGES 
THROUGH THE NIGHT. BED IN LOW LOCKED POSITION WITH SIDE RAILS X2. CALL LIGHT WITHIN REACH. 
ENDORSED TO AM SHIFT FOR YAMILA.

## 2019-09-28 NOTE — NUR
PATIENT RESTING COMFORTABLY IN BED. A/OX4. BREATHING EVEN AND UNLABORED. A NEW IV ACCESS IN 
RFA G20 WITH ZOSYN INFUSING AS ORDERED , PATENT AND INTACT. HAS DESTINY DRAIN INTACT WITH OUTPUT 
10CC. ALL NEEDS MET. NO SIGNIFICANT CHANGES THROUGH THE SHIFT. BED IN LOW LOCKED POSITION 
WITH SIDE RAILS X2. CALL LIGHT WITHIN REACH. ENDORSED TO NEXT SHIFT FOR YAMILA.

## 2019-09-28 NOTE — NUR
MS RN OPENING NOTES



Received patient in bed, watching TV, alert, oriented x 4. Citizen of the [place] speaking. Breathing even 
and unlabored. Not in any distress. IV zosyn currently running at 25mL/hr. No complaints at 
this time. DESTINY drain in place with minimal serous sanguinous output. Safety measures in 
place; call light within reach. Bed in low, locked position. Will continue to monitor 
accordingly

## 2019-09-29 VITALS — SYSTOLIC BLOOD PRESSURE: 102 MMHG | DIASTOLIC BLOOD PRESSURE: 60 MMHG

## 2019-09-29 LAB
BASOPHILS # BLD AUTO: 0 /CMM (ref 0–0.2)
BASOPHILS NFR BLD AUTO: 0.2 % (ref 0–2)
BUN SERPL-MCNC: 7 MG/DL (ref 7–18)
CALCIUM SERPL-MCNC: 8 MG/DL (ref 8.5–10.1)
CHLORIDE SERPL-SCNC: 98 MMOL/L (ref 98–107)
CO2 SERPL-SCNC: 26 MMOL/L (ref 21–32)
CREAT SERPL-MCNC: 0.8 MG/DL (ref 0.6–1.3)
EOSINOPHIL NFR BLD AUTO: 0.6 % (ref 0–6)
GLUCOSE SERPL-MCNC: 86 MG/DL (ref 74–106)
HCT VFR BLD AUTO: 35 % (ref 39–51)
HGB BLD-MCNC: 11.9 G/DL (ref 13.5–17.5)
LYMPHOCYTES NFR BLD AUTO: 1.1 /CMM (ref 0.8–4.8)
LYMPHOCYTES NFR BLD AUTO: 9.6 % (ref 20–44)
MCHC RBC AUTO-ENTMCNC: 34 G/DL (ref 31–36)
MCV RBC AUTO: 94 FL (ref 80–96)
MONOCYTES NFR BLD AUTO: 1.1 /CMM (ref 0.1–1.3)
MONOCYTES NFR BLD AUTO: 9.9 % (ref 2–12)
NEUTROPHILS # BLD AUTO: 9.2 /CMM (ref 1.8–8.9)
NEUTROPHILS NFR BLD AUTO: 79.7 % (ref 43–81)
PLATELET # BLD AUTO: 267 /CMM (ref 150–450)
POTASSIUM SERPL-SCNC: 3.4 MMOL/L (ref 3.5–5.1)
RBC # BLD AUTO: 3.7 MIL/UL (ref 4.5–6)
SODIUM SERPL-SCNC: 135 MMOL/L (ref 136–145)
WBC NRBC COR # BLD AUTO: 11.5 K/UL (ref 4.3–11)

## 2019-09-29 RX ADMIN — ATORVASTATIN CALCIUM SCH MG: 10 TABLET, FILM COATED ORAL at 08:19

## 2019-09-29 RX ADMIN — DONEPEZIL HYDROCHLORIDE SCH MG: 5 TABLET ORAL at 08:18

## 2019-09-29 RX ADMIN — PANCRELIPASE SCH EACH: 4200; 24600; 14200 CAPSULE, DELAYED RELEASE ORAL at 08:18

## 2019-09-29 RX ADMIN — SODIUM CHLORIDE PRN MLS/HR: 9 INJECTION, SOLUTION INTRAVENOUS at 06:04

## 2019-09-29 RX ADMIN — Medication PRN MG: at 06:23

## 2019-09-29 RX ADMIN — PIPERACILLIN SODIUM AND TAZOBACTAM SODIUM SCH MLS/HR: .375; 3 INJECTION, POWDER, LYOPHILIZED, FOR SOLUTION INTRAVENOUS at 08:13

## 2019-09-29 RX ADMIN — Medication SCH MG: at 08:17

## 2019-09-29 RX ADMIN — MECLIZINE HYDROCLORIDE SCH MG: 25 TABLET ORAL at 08:18

## 2019-09-29 RX ADMIN — FLUTICASONE FUROATE AND VILANTEROL TRIFENATATE SCH EACH: 100; 25 POWDER RESPIRATORY (INHALATION) at 08:19

## 2019-09-29 RX ADMIN — ESCITALOPRAM OXALATE SCH MG: 10 TABLET, FILM COATED ORAL at 08:13

## 2019-09-29 RX ADMIN — ALBUTEROL SULFATE PRN MG: 2.5 SOLUTION RESPIRATORY (INHALATION) at 06:23

## 2019-09-29 RX ADMIN — PANCRELIPASE SCH EACH: 4200; 24600; 14200 CAPSULE, DELAYED RELEASE ORAL at 12:30

## 2019-09-29 RX ADMIN — Medication PRN EACH: at 05:47

## 2019-09-29 RX ADMIN — Medication PRN EACH: at 12:30

## 2019-09-29 RX ADMIN — MONTELUKAST SODIUM SCH MG: 10 TABLET, FILM COATED ORAL at 08:18

## 2019-09-29 RX ADMIN — DUTASTERIDE SCH MG: 0.5 CAPSULE, LIQUID FILLED ORAL at 08:18

## 2019-09-29 NOTE — NUR
Patient cleared for D/C home by MD. Patient A/Ox4, breathing unlabored and even on room air, 
VS are stable and within baseline. Dressing clean and intact. All needs attended; patient 
medicated before discharge. Patient tolerated regular soft diet well; no N/V/D noted. IV 
line removed, ID wrist band removed. D/C instructions, prescription, education provided to 
patient and his wife: both verbalized understanding. Patient will f/u with PCP in one week. 
All d/c papers signed including valuable form and all belongings with the patient. Patient 
safely transferred to [facility]  via wheelchair accompanied by wife and SHIELA Kramer

## 2019-09-29 NOTE — NUR
DESTINY drain removed at bedside by DR. Crews. Patient tolerated well, no bleeding noted, no 
s/s infection noted

## 2019-09-29 NOTE — NUR
MS RN CLOSING NOTES



Patient resting in bed, alert, oriented x 4, Georgian speaking. Breathing even and 
unlabored. Not in any distress. Peripheral IV infusing at 75mL/hr. No complaints at this 
time. DESTINY drain in place with 25mL output. No acute changes overnight. Safety measures in 
place; call light within reach. Bed in low, locked position. Will endorse YAMILA to oncoming RN